# Patient Record
Sex: FEMALE | URBAN - METROPOLITAN AREA
[De-identification: names, ages, dates, MRNs, and addresses within clinical notes are randomized per-mention and may not be internally consistent; named-entity substitution may affect disease eponyms.]

---
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
## 2019-01-30 NOTE — PATIENT DISCUSSION
Patient understands condition, prognosis and need for follow up care. Return in 2 months for glc work up.

## 2019-06-24 ENCOUNTER — IMPORTED ENCOUNTER (OUTPATIENT)
Dept: URBAN - METROPOLITAN AREA CLINIC 31 | Facility: CLINIC | Age: 19
End: 2019-06-24

## 2019-06-24 PROCEDURE — 92014 COMPRE OPH EXAM EST PT 1/>: CPT

## 2019-06-24 PROCEDURE — 92310 CONTACT LENS FITTING OU: CPT

## 2019-06-24 NOTE — PATIENT DISCUSSION
1.  Refractive error - Dispense trial Dailies Total 1 to see if less dryness. If prefers can order. If not stay with AV 1 Day Moist and use Blink during the day. Stop/wear and call if any redness pain or decrease in vision occur. Glasses change optional. 2.  Return for an appointment in 1 year for comprehensive exam. with Dr. Pilo Womack.

## 2021-02-23 ENCOUNTER — IMPORTED ENCOUNTER (OUTPATIENT)
Dept: URBAN - METROPOLITAN AREA CLINIC 31 | Facility: CLINIC | Age: 21
End: 2021-02-23

## 2021-02-23 PROCEDURE — 92310 CONTACT LENS FITTING OU: CPT

## 2021-02-23 PROCEDURE — S0621 ROUTINE OPHTHALMOLOGICAL EXA: HCPCS

## 2021-02-23 NOTE — PATIENT DISCUSSION
Refractive error - Continue present contact lens modality with updated craig. Stop/wear and call if any redness pain or decrease in vision occur. No glasses necessary.

## 2022-02-14 ENCOUNTER — IMPORTED ENCOUNTER (OUTPATIENT)
Dept: URBAN - METROPOLITAN AREA CLINIC 31 | Facility: CLINIC | Age: 22
End: 2022-02-14

## 2022-02-14 PROBLEM — H43.393: Noted: 2022-02-14

## 2022-02-14 PROBLEM — H43.813: Noted: 2022-02-14

## 2022-02-14 PROCEDURE — 92015 DETERMINE REFRACTIVE STATE: CPT

## 2022-02-14 PROCEDURE — 99214 OFFICE O/P EST MOD 30 MIN: CPT

## 2022-02-14 PROCEDURE — 92310 CONTACT LENS FITTING OU: CPT

## 2022-02-14 NOTE — PATIENT DISCUSSION
1.  Floaters OU:  Patient was cautioned to call our office immediately if they experience a substantial change in their symptoms such as an increase in floaters persistent flashes loss of visual field (may appear as a shadow or a curtain) or decrease in visual acuity as these may indicate a retinal tear or detachment. If this is a new problem patient will need to return for re-examination  as determined by the physician. 2. Refractive error-3.   Return in 1 year for comprehensive eye exam with Dr Echavarria Area

## 2022-02-14 NOTE — PATIENT DISCUSSION
1.  PVD OU:  Patient was cautioned to call our office immediately if they experience a substantial change in their symptoms such as an increase in floaters persistent flashes loss of visual field (may appear as a shadow or a curtain) or decrease in visual acuity as these may indicate a retinal tear or detachment. If this is a new problem patient will need to return for re-examination  as determined by the [address]. Refractive error-3.  Return in 1 year for comprehensive eye exam with Dr Heron Biggs

## 2022-04-02 ASSESSMENT — VISUAL ACUITY
OS_CC: J1+
OD_SC: 20/20
OS_SC: 20/15-3
OD_SC: 20/15-2
OS_SC: 20/20
OS_SC: 20/20
OD_SC: 20/20
OD_CC: J1+

## 2022-04-02 ASSESSMENT — TONOMETRY
OS_IOP_MMHG: 14
OD_IOP_MMHG: 14
OS_IOP_MMHG: 12
OS_IOP_MMHG: 13
OS_IOP_MMHG: 15
OD_IOP_MMHG: 14
OS_IOP_MMHG: 12
OD_IOP_MMHG: 12
OD_IOP_MMHG: 13
OD_IOP_MMHG: 12

## 2022-04-07 NOTE — PATIENT DISCUSSION
Stressed compliance, pt was taking either AT or latanoprost, ok to take both in the same day, just not at the same time. Pt declined SLT consult.

## 2022-06-14 NOTE — PATIENT DISCUSSION
cataracts are visually significant, however patient declines surgical consult at this time. 29-May-2021 05:26

## 2022-06-14 NOTE — PROCEDURE NOTE: CLINICAL
PROCEDURE NOTE: SLT #1 OU. Anesthesia: Topical. Prep: Alphagan 0.15%. Prior to treatment, risks/benefits/alternatives discussed including infection, loss of vision, hemorrhage, cataract, glaucoma, retinal tears or detachment. Lens:  SLT laser lens with goniosol. Power: 1.2mJ. Total applications: 322/856. Application 567 degrees. Patient tolerated procedure well. There were no complications. Post-op instructions given. Post-op IOP = 09 ou  mmHg. Nghia oRsenbaum

## 2022-12-09 NOTE — PATIENT DISCUSSION
RECOMMEND CANALOPLASTY W/ ISTENT VS HYDRUS OU AT TIME OF CE/IOL FOR ADDED IOP CONTROL.  CONTINUE LATANOPROST.

## 2022-12-09 NOTE — PATIENT DISCUSSION
PLAN:  CE/IOL OS THEN OD W/ CANALOPLASTY W/ ISTENT VS HYDRUS OU, GOAL CHRISTINE, PT WANTS CUSTOM OU.

## 2022-12-09 NOTE — PATIENT DISCUSSION
RECOMMEND CANALOPLASTY W/ ISTENT VS HYDRUS OU AT TIME OF CE/IOL FOR ADDED IOP CONTROL.  DISCUSSED LIMITATIONS SECONDARY TO LASIK OU.

## 2023-01-20 NOTE — PATIENT DISCUSSION
Cataract surgery has been performed in the first eye and activities of daily living are still impaired. The patient would like to proceed with cataract surgery in the second eye as scheduled. The patient elects CV OD, goal of emmetropia Hydrus/iStent.

## 2023-11-21 NOTE — PATIENT DISCUSSION
Detail Level: Detailed Patient achieved a 15% reduction in IOP from pretreatment levels or a plan is in place to achieve this goal. Add 1585x Cpt? (Do Not Bill If You Billed For The Procedure Placing The Sutures. This Is An Add-On Code That Must Be Billed With An E/M Visit Code): No